# Patient Record
Sex: FEMALE
[De-identification: names, ages, dates, MRNs, and addresses within clinical notes are randomized per-mention and may not be internally consistent; named-entity substitution may affect disease eponyms.]

---

## 2017-12-08 ENCOUNTER — HOSPITAL ENCOUNTER (OUTPATIENT)
Dept: HOSPITAL 92 - BICRAD | Age: 42
Discharge: HOME | End: 2017-12-08
Attending: CHIROPRACTOR
Payer: COMMERCIAL

## 2017-12-08 DIAGNOSIS — M54.2: Primary | ICD-10-CM

## 2017-12-08 DIAGNOSIS — M41.84: ICD-10-CM

## 2017-12-08 DIAGNOSIS — M41.86: ICD-10-CM

## 2017-12-08 DIAGNOSIS — M47.816: ICD-10-CM

## 2017-12-08 DIAGNOSIS — M47.812: ICD-10-CM

## 2017-12-08 DIAGNOSIS — M54.5: ICD-10-CM

## 2017-12-08 DIAGNOSIS — M47.814: ICD-10-CM

## 2017-12-08 PROCEDURE — 72070 X-RAY EXAM THORAC SPINE 2VWS: CPT

## 2017-12-08 PROCEDURE — 72100 X-RAY EXAM L-S SPINE 2/3 VWS: CPT

## 2017-12-08 PROCEDURE — 72040 X-RAY EXAM NECK SPINE 2-3 VW: CPT

## 2018-03-22 ENCOUNTER — HOSPITAL ENCOUNTER (EMERGENCY)
Dept: HOSPITAL 92 - ERS | Age: 43
Discharge: HOME | End: 2018-03-22
Payer: COMMERCIAL

## 2018-03-22 DIAGNOSIS — F41.9: ICD-10-CM

## 2018-03-22 DIAGNOSIS — F32.9: ICD-10-CM

## 2018-03-22 DIAGNOSIS — R55: Primary | ICD-10-CM

## 2018-03-22 PROCEDURE — 71045 X-RAY EXAM CHEST 1 VIEW: CPT

## 2018-03-22 PROCEDURE — 70450 CT HEAD/BRAIN W/O DYE: CPT

## 2018-03-22 PROCEDURE — 72125 CT NECK SPINE W/O DYE: CPT

## 2018-03-22 PROCEDURE — 93005 ELECTROCARDIOGRAM TRACING: CPT

## 2018-03-22 NOTE — CT
CT BRAIN:

 

HISTORY:

Syncope.  Patient with history of fall anxiety.

 

TECHNIQUE:

Noncontrast enhanced CT images of the brain are obtained.

 

FINDINGS:

Images demonstrate the brain to be unremarkable.  No evidence of intracranial masses, hemorrhages, st
rokes, or contusions seen.  The ventricles are of normal size.

 

IMPRESSION:

Normal CT brain.

 

POS: [facility]

## 2018-03-22 NOTE — CT
CT CERVICAL SPINE:

 

HISTORY:

Syncope.  Fall.

 

TECHNIQUE:

Noncontrast enhanced CT images of the cervical spine are obtained.  Sagittal and coronal reconstructe
d images are performed.

 

FINDINGS:

CT images of the cervical spine demonstrate no evidence of an acute cervical spine fracture, subluxat
ions, or bony lesions.  Vertebral bodies and facets are unremarkable.

 

IMPRESSION:

Normal CT cervical spine.

 

POS: [facility]

## 2018-03-22 NOTE — RAD
CHEST ONE VIEW:

 

HISTORY:

Syncope.

 

COMPARISON:

None.

 

FINDINGS:

The lungs are clear without pneumothorax or effusion.  The cardiac silhouette and mediastinal contour
s are within normal limits.

 

IMPRESSION:

No acute intrathoracic abnormality.

 

POS: OFF